# Patient Record
Sex: MALE | Race: WHITE | NOT HISPANIC OR LATINO | Employment: OTHER | URBAN - NONMETROPOLITAN AREA
[De-identification: names, ages, dates, MRNs, and addresses within clinical notes are randomized per-mention and may not be internally consistent; named-entity substitution may affect disease eponyms.]

---

## 2017-08-15 ENCOUNTER — HOSPITAL ENCOUNTER (EMERGENCY)
Facility: HOSPITAL | Age: 72
End: 2017-08-15
Attending: EMERGENCY MEDICINE | Admitting: EMERGENCY MEDICINE

## 2017-08-15 VITALS — HEIGHT: 72 IN | TEMPERATURE: 97 F | BODY MASS INDEX: 29.8 KG/M2 | WEIGHT: 220 LBS

## 2017-08-15 DIAGNOSIS — I46.9 CARDIAC ARREST (HCC): Primary | ICD-10-CM

## 2017-08-15 PROCEDURE — 25010000002 EPINEPHRINE 0.1 MG/ML SOLUTION PREFILLED SYRINGE: Performed by: EMERGENCY MEDICINE

## 2017-08-15 PROCEDURE — 99284 EMERGENCY DEPT VISIT MOD MDM: CPT

## 2017-08-15 PROCEDURE — 92950 HEART/LUNG RESUSCITATION CPR: CPT

## 2017-08-15 RX ADMIN — EPINEPHRINE 1 MG: 0.1 INJECTION, SOLUTION ENDOTRACHEAL; INTRACARDIAC; INTRAVENOUS at 01:25

## 2017-08-15 RX ADMIN — EPINEPHRINE 1 MG: 0.1 INJECTION, SOLUTION ENDOTRACHEAL; INTRACARDIAC; INTRAVENOUS at 01:22

## 2017-08-15 NOTE — ED PROVIDER NOTES
Subjective   HPI Comments: 71-year-old male presenting with cardiac arrest.  The patient is intubated and unresponsive.  History obtained via EMS.  Apparently tonight patient to wife that he felt unwell, had some slight shortness of breath, she gave him a brown paper bag to breathe in.  Shortly thereafter he was found unresponsive.  EMS arrived on scene to find patient unresponsive, asystolic.  He was intubated and ACLS was initiated.  No other history or pertinent information able to be obtained.  Of note when patient was intubated a large amount of frothy sputum was suctioned.      Review of Systems   Unable to perform ROS: Patient unresponsive       Past Medical History:   Diagnosis Date   • Hypertension        Allergies   Allergen Reactions   • Lisinopril        Past Surgical History:   Procedure Laterality Date   • CHOLECYSTECTOMY  2014   • HERNIA REPAIR  1980s?       No family history on file.    Social History     Social History   • Marital status:      Spouse name: N/A   • Number of children: N/A   • Years of education: N/A     Social History Main Topics   • Smoking status: Former Smoker   • Smokeless tobacco: Not on file      Comment: quit 2012   • Alcohol use No      Comment: quit 1997   • Drug use: No   • Sexual activity: Not on file     Other Topics Concern   • Not on file     Social History Narrative           Objective   Physical Exam   Constitutional: He appears well-developed and well-nourished. No distress.   Critically ill appearing elderly man   HENT:   Head: Normocephalic and atraumatic.   Right Ear: External ear normal.   Left Ear: External ear normal.   Nose: Nose normal.   Eyes:   Pupils dilated and fixed   Cardiovascular:   Pulseless with ongoing CPR via Klever device   Pulmonary/Chest:   Apneic, ventilated via ET tube, bilateral breath sounds, coarse   Abdominal: Soft. He exhibits no distension.   Genitourinary: Penis normal.   Musculoskeletal: He exhibits no edema or deformity.    Neurological:   GCS 3T   Skin:   Cool, cyanotic   Psychiatric:   Unable to assess   Nursing note and vitals reviewed.      Procedures         ED Course  ED Course                  MDM  Number of Diagnoses or Management Options  Diagnosis management comments: 71-year-old man presenting in cardiac arrest.  Chronically ill-appearing elderly man with exam as above.  The patient is intubated with bilateral breath sounds.  Ongoing CPR.  We continued ACLS with 2 more rounds of epinephrine.  At this point patient had been down almost one hour.  He has been in asystole the entire time.  Bedside ultrasound reveals cardiac standstill, no effusion, bilateral lung sliding.  At this time further resuscitative efforts were deemed to be futile.  Time of death 0128.   notified.  Family updated.    Ddx: Cardiac arrest    Critical Care  Total time providing critical care: 30-74 minutes      Final diagnoses:   Cardiac arrest            Hakeem Almaraz MD  08/15/17 0151

## 2017-08-15 NOTE — NURSING NOTE
0130-family in quiet room.  notified.  0202-  here. ANDREI notified.  0230-Family decline donation per Adeline FORBES. CASE # [id number]